# Patient Record
Sex: MALE | Race: WHITE
[De-identification: names, ages, dates, MRNs, and addresses within clinical notes are randomized per-mention and may not be internally consistent; named-entity substitution may affect disease eponyms.]

---

## 2018-08-31 ENCOUNTER — HOSPITAL ENCOUNTER (EMERGENCY)
Dept: HOSPITAL 91 - E/R | Age: 28
LOS: 1 days | Discharge: HOME | End: 2018-09-01
Payer: COMMERCIAL

## 2018-08-31 ENCOUNTER — HOSPITAL ENCOUNTER (EMERGENCY)
Age: 28
LOS: 1 days | Discharge: HOME | End: 2018-09-01

## 2018-08-31 DIAGNOSIS — R19.7: ICD-10-CM

## 2018-08-31 DIAGNOSIS — R10.13: Primary | ICD-10-CM

## 2018-08-31 DIAGNOSIS — Z87.891: ICD-10-CM

## 2018-08-31 DIAGNOSIS — R40.2252: ICD-10-CM

## 2018-08-31 DIAGNOSIS — R40.2362: ICD-10-CM

## 2018-08-31 DIAGNOSIS — R40.2142: ICD-10-CM

## 2018-08-31 PROCEDURE — 36415 COLL VENOUS BLD VENIPUNCTURE: CPT

## 2018-08-31 PROCEDURE — 96361 HYDRATE IV INFUSION ADD-ON: CPT

## 2018-08-31 PROCEDURE — 81003 URINALYSIS AUTO W/O SCOPE: CPT

## 2018-08-31 PROCEDURE — 76705 ECHO EXAM OF ABDOMEN: CPT

## 2018-08-31 PROCEDURE — 96374 THER/PROPH/DIAG INJ IV PUSH: CPT

## 2018-08-31 PROCEDURE — 80053 COMPREHEN METABOLIC PANEL: CPT

## 2018-08-31 PROCEDURE — 96375 TX/PRO/DX INJ NEW DRUG ADDON: CPT

## 2018-08-31 PROCEDURE — 85025 COMPLETE CBC W/AUTO DIFF WBC: CPT

## 2018-08-31 PROCEDURE — 96372 THER/PROPH/DIAG INJ SC/IM: CPT

## 2018-08-31 PROCEDURE — 83690 ASSAY OF LIPASE: CPT

## 2018-08-31 PROCEDURE — 99285 EMERGENCY DEPT VISIT HI MDM: CPT

## 2018-09-01 LAB
ADD MAN DIFF?: NO
ALANINE AMINOTRANSFERASE: 102 IU/L (ref 13–69)
ALBUMIN/GLOBULIN RATIO: 1.26
ALBUMIN: 4.3 G/DL (ref 3.3–4.9)
ALKALINE PHOSPHATASE: 85 IU/L (ref 42–121)
ANION GAP: 16 (ref 8–16)
ASPARTATE AMINO TRANSFERASE: 42 IU/L (ref 15–46)
BASOPHIL #: 0 10^3/UL (ref 0–0.1)
BASOPHILS %: 0.3 % (ref 0–2)
BILIRUBIN,DIRECT: 0 MG/DL (ref 0–0.2)
BILIRUBIN,TOTAL: 0.7 MG/DL (ref 0.2–1.3)
BLOOD UREA NITROGEN: 15 MG/DL (ref 7–20)
CALCIUM: 9.5 MG/DL (ref 8.4–10.2)
CARBON DIOXIDE: 28 MMOL/L (ref 21–31)
CHLORIDE: 101 MMOL/L (ref 97–110)
CREATININE: 0.85 MG/DL (ref 0.61–1.24)
EOSINOPHILS #: 0.1 10^3/UL (ref 0–0.5)
EOSINOPHILS %: 0.7 % (ref 0–7)
GLOBULIN: 3.4 G/DL (ref 1.3–3.2)
GLUCOSE: 112 MG/DL (ref 70–220)
HEMATOCRIT: 46.2 % (ref 42–52)
HEMOGLOBIN: 16.2 G/DL (ref 14–18)
IMMATURE GRANS #M: 0.03 10^3/UL (ref 0–0.03)
IMMATURE GRANS % (M): 0.3 % (ref 0–0.43)
LIPASE: 39 U/L (ref 23–300)
LYMPHOCYTES #: 1.5 10^3/UL (ref 0.8–2.9)
LYMPHOCYTES %: 12.7 % (ref 15–51)
MEAN CORPUSCULAR HEMOGLOBIN: 31.5 PG (ref 29–33)
MEAN CORPUSCULAR HGB CONC: 35.1 G/DL (ref 32–37)
MEAN CORPUSCULAR VOLUME: 89.7 FL (ref 82–101)
MEAN PLATELET VOLUME: 10.9 FL (ref 7.4–10.4)
MONOCYTE #: 0.7 10^3/UL (ref 0.3–0.9)
MONOCYTES %: 6.2 % (ref 0–11)
NEUTROPHIL #: 9.4 10^3/UL (ref 1.6–7.5)
NEUTROPHILS %: 79.8 % (ref 39–77)
NUCLEATED RED BLOOD CELLS #: 0 10^3/UL (ref 0–0)
NUCLEATED RED BLOOD CELLS%: 0 /100WBC (ref 0–0)
PLATELET COUNT: 201 10^3/UL (ref 140–415)
POTASSIUM: 4 MMOL/L (ref 3.5–5.1)
RED BLOOD COUNT: 5.15 10^6/UL (ref 4.7–6.1)
RED CELL DISTRIBUTION WIDTH: 11.8 % (ref 11.5–14.5)
SODIUM: 141 MMOL/L (ref 135–144)
TOTAL PROTEIN: 7.7 G/DL (ref 6.1–8.1)
URINE PH (DIP) POC: 6.5 (ref 5–8.5)
WHITE BLOOD COUNT: 11.8 10^3/UL (ref 4.8–10.8)

## 2018-09-01 RX ADMIN — FAMOTIDINE 1 MG: 10 INJECTION, SOLUTION INTRAVENOUS at 01:40

## 2018-09-01 RX ADMIN — KETOROLAC TROMETHAMINE 1 MG: 15 INJECTION, SOLUTION INTRAMUSCULAR; INTRAVENOUS at 01:08

## 2018-09-01 RX ADMIN — ALUMINUM HYDROXIDE, MAGNESIUM HYDROXIDE, DIMETHICONE 1 ML: 200; 200; 20 SUSPENSION ORAL at 01:40

## 2018-09-01 RX ADMIN — DICYCLOMINE HYDROCHLORIDE 1 MG: 20 INJECTION, SOLUTION INTRAMUSCULAR at 02:00

## 2018-09-01 RX ADMIN — THIAMINE HYDROCHLORIDE 1 MLS/HR: 100 INJECTION, SOLUTION INTRAMUSCULAR; INTRAVENOUS at 01:09

## 2018-09-01 RX ADMIN — ONDANSETRON HYDROCHLORIDE 1 MG: 2 INJECTION, SOLUTION INTRAMUSCULAR; INTRAVENOUS at 01:08

## 2020-09-25 ENCOUNTER — HOSPITAL ENCOUNTER (EMERGENCY)
Dept: HOSPITAL 54 - ER | Age: 30
Discharge: HOME | End: 2020-09-25
Payer: MEDICAID

## 2020-09-25 VITALS — SYSTOLIC BLOOD PRESSURE: 127 MMHG | DIASTOLIC BLOOD PRESSURE: 68 MMHG

## 2020-09-25 VITALS — HEIGHT: 66 IN | BODY MASS INDEX: 25.07 KG/M2 | WEIGHT: 156 LBS
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
## 2020-09-25 DIAGNOSIS — S80.211A: ICD-10-CM

## 2020-09-25 DIAGNOSIS — R07.81: ICD-10-CM

## 2020-09-25 DIAGNOSIS — M25.531: ICD-10-CM
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
## 2020-09-25 DIAGNOSIS — S80.212A: Primary | ICD-10-CM

## 2020-09-25 PROCEDURE — 73564 X-RAY EXAM KNEE 4 OR MORE: CPT

## 2020-09-25 PROCEDURE — 73110 X-RAY EXAM OF WRIST: CPT

## 2020-09-25 PROCEDURE — 99284 EMERGENCY DEPT VISIT MOD MDM: CPT

## 2020-09-25 PROCEDURE — 96372 THER/PROPH/DIAG INJ SC/IM: CPT

## 2020-09-25 PROCEDURE — 71100 X-RAY EXAM RIBS UNI 2 VIEWS: CPT

## 2020-09-25 RX ADMIN — KETOROLAC TROMETHAMINE ONE MG: 30 INJECTION, SOLUTION INTRAMUSCULAR at 10:54

## 2020-09-25 RX ADMIN — CLOSTRIDIUM TETANI TOXOID ANTIGEN (FORMALDEHYDE INACTIVATED), CORYNEBACTERIUM DIPHTHERIAE TOXOID ANTIGEN (FORMALDEHYDE INACTIVATED), BORDETELLA PERTUSSIS TOXOID ANTIGEN (GLUTARALDEHYDE INACTIVATED), BORDETELLA PERTUSSIS FILAMENTOUS HEMAGGLUTININ ANTIGEN (FORMALDEHYDE INACTIVATED), BORDETELLA PERTUSSIS PERTACTIN ANTIGEN, AND BORDETELLA PERTUSSIS FIMBRIAE 2/3 ANTIGEN ONE ML: 5; 2; 2.5; 5; 3; 5 INJECTION, SUSPENSION INTRAMUSCULAR at 11:05

## 2020-09-25 RX ADMIN — Medication ONE TAB: at 10:54

## 2020-09-25 NOTE — NUR
Patient discharged to home in stable condition. Written and verbal after care 
instructions given. Patient verbalizes understanding of instruction. Patient 
will be picked up by mom at the waiting room. Instructed not to drive
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
